# Patient Record
Sex: FEMALE | Race: BLACK OR AFRICAN AMERICAN | NOT HISPANIC OR LATINO | Employment: PART TIME | URBAN - METROPOLITAN AREA
[De-identification: names, ages, dates, MRNs, and addresses within clinical notes are randomized per-mention and may not be internally consistent; named-entity substitution may affect disease eponyms.]

---

## 2024-02-14 ENCOUNTER — HOSPITAL ENCOUNTER (EMERGENCY)
Facility: HOSPITAL | Age: 39
Discharge: OTHER INSTITUTION | End: 2024-02-15
Attending: STUDENT IN AN ORGANIZED HEALTH CARE EDUCATION/TRAINING PROGRAM

## 2024-02-14 DIAGNOSIS — S05.00XA CORNEAL ABRASION, UNSPECIFIED LATERALITY, INITIAL ENCOUNTER: Primary | ICD-10-CM

## 2024-02-14 PROCEDURE — 99285 EMERGENCY DEPT VISIT HI MDM: CPT | Performed by: STUDENT IN AN ORGANIZED HEALTH CARE EDUCATION/TRAINING PROGRAM

## 2024-02-14 PROCEDURE — 99283 EMERGENCY DEPT VISIT LOW MDM: CPT

## 2024-02-14 RX ORDER — TETRACAINE HYDROCHLORIDE 5 MG/ML
1 SOLUTION OPHTHALMIC ONCE
Status: COMPLETED | OUTPATIENT
Start: 2024-02-14 | End: 2024-02-15

## 2024-02-14 RX ORDER — SODIUM BICARBONATE 1 MEQ/ML
50 SYRINGE (ML) INTRAVENOUS ONCE
Status: DISCONTINUED | OUTPATIENT
Start: 2024-02-15 | End: 2024-02-15

## 2024-02-14 RX ORDER — ERYTHROMYCIN 5 MG/G
1 OINTMENT OPHTHALMIC ONCE
Status: COMPLETED | OUTPATIENT
Start: 2024-02-15 | End: 2024-02-15

## 2024-02-14 ASSESSMENT — PAIN SCALES - GENERAL: PAINLEVEL_OUTOF10: 10 - WORST POSSIBLE PAIN

## 2024-02-14 ASSESSMENT — PAIN - FUNCTIONAL ASSESSMENT: PAIN_FUNCTIONAL_ASSESSMENT: 0-10

## 2024-02-14 ASSESSMENT — COLUMBIA-SUICIDE SEVERITY RATING SCALE - C-SSRS
2. HAVE YOU ACTUALLY HAD ANY THOUGHTS OF KILLING YOURSELF?: NO
1. IN THE PAST MONTH, HAVE YOU WISHED YOU WERE DEAD OR WISHED YOU COULD GO TO SLEEP AND NOT WAKE UP?: NO
6. HAVE YOU EVER DONE ANYTHING, STARTED TO DO ANYTHING, OR PREPARED TO DO ANYTHING TO END YOUR LIFE?: NO

## 2024-02-14 ASSESSMENT — LIFESTYLE VARIABLES
HAVE PEOPLE ANNOYED YOU BY CRITICIZING YOUR DRINKING: NO
EVER HAD A DRINK FIRST THING IN THE MORNING TO STEADY YOUR NERVES TO GET RID OF A HANGOVER: NO
EVER FELT BAD OR GUILTY ABOUT YOUR DRINKING: NO
HAVE YOU EVER FELT YOU SHOULD CUT DOWN ON YOUR DRINKING: NO

## 2024-02-14 ASSESSMENT — PAIN DESCRIPTION - LOCATION: LOCATION: EYE

## 2024-02-14 ASSESSMENT — PAIN DESCRIPTION - PAIN TYPE: TYPE: ACUTE PAIN

## 2024-02-14 ASSESSMENT — PAIN DESCRIPTION - FREQUENCY: FREQUENCY: CONSTANT/CONTINUOUS

## 2024-02-14 ASSESSMENT — PAIN DESCRIPTION - ORIENTATION: ORIENTATION: LEFT

## 2024-02-15 ENCOUNTER — HOSPITAL ENCOUNTER (EMERGENCY)
Facility: HOSPITAL | Age: 39
Discharge: HOME | End: 2024-02-15
Attending: EMERGENCY MEDICINE

## 2024-02-15 VITALS
BODY MASS INDEX: 23.98 KG/M2 | TEMPERATURE: 98 F | HEIGHT: 61 IN | RESPIRATION RATE: 16 BRPM | OXYGEN SATURATION: 96 % | HEART RATE: 76 BPM | SYSTOLIC BLOOD PRESSURE: 175 MMHG | WEIGHT: 127 LBS | DIASTOLIC BLOOD PRESSURE: 110 MMHG

## 2024-02-15 VITALS
HEART RATE: 76 BPM | TEMPERATURE: 99.1 F | SYSTOLIC BLOOD PRESSURE: 177 MMHG | RESPIRATION RATE: 17 BRPM | OXYGEN SATURATION: 98 % | HEIGHT: 61 IN | DIASTOLIC BLOOD PRESSURE: 106 MMHG | BODY MASS INDEX: 23.98 KG/M2 | WEIGHT: 127 LBS

## 2024-02-15 DIAGNOSIS — S05.02XA ABRASION OF LEFT CORNEA, INITIAL ENCOUNTER: Primary | ICD-10-CM

## 2024-02-15 PROCEDURE — 99283 EMERGENCY DEPT VISIT LOW MDM: CPT | Performed by: EMERGENCY MEDICINE

## 2024-02-15 PROCEDURE — 99284 EMERGENCY DEPT VISIT MOD MDM: CPT | Mod: 25

## 2024-02-15 PROCEDURE — 2500000001 HC RX 250 WO HCPCS SELF ADMINISTERED DRUGS (ALT 637 FOR MEDICARE OP): Performed by: STUDENT IN AN ORGANIZED HEALTH CARE EDUCATION/TRAINING PROGRAM

## 2024-02-15 PROCEDURE — 2500000001 HC RX 250 WO HCPCS SELF ADMINISTERED DRUGS (ALT 637 FOR MEDICARE OP)

## 2024-02-15 PROCEDURE — 99284 EMERGENCY DEPT VISIT MOD MDM: CPT | Performed by: EMERGENCY MEDICINE

## 2024-02-15 RX ORDER — MOXIFLOXACIN 5 MG/ML
1 SOLUTION/ DROPS OPHTHALMIC ONCE
Status: COMPLETED | OUTPATIENT
Start: 2024-02-15 | End: 2024-02-15

## 2024-02-15 RX ORDER — LORAZEPAM 0.5 MG/1
0.5 TABLET ORAL ONCE
Status: COMPLETED | OUTPATIENT
Start: 2024-02-15 | End: 2024-02-15

## 2024-02-15 RX ORDER — ERYTHROMYCIN 5 MG/G
1 OINTMENT OPHTHALMIC ONCE
Status: COMPLETED | OUTPATIENT
Start: 2024-02-15 | End: 2024-02-15

## 2024-02-15 RX ORDER — IBUPROFEN 600 MG/1
600 TABLET ORAL EVERY 6 HOURS PRN
Qty: 28 TABLET | Refills: 0 | Status: SHIPPED | OUTPATIENT
Start: 2024-02-15 | End: 2024-02-22

## 2024-02-15 RX ORDER — LORAZEPAM 1 MG/1
TABLET ORAL
Status: COMPLETED
Start: 2024-02-15 | End: 2024-02-15

## 2024-02-15 RX ORDER — MULTIVIT-MIN/FERROUS FUMARATE 9 MG/15 ML
1 LIQUID (ML) ORAL 4 TIMES DAILY
Qty: 15 ML | Refills: 0 | Status: SHIPPED | OUTPATIENT
Start: 2024-02-15

## 2024-02-15 RX ORDER — TETRACAINE HYDROCHLORIDE 5 MG/ML
SOLUTION OPHTHALMIC
Status: COMPLETED
Start: 2024-02-15 | End: 2024-02-15

## 2024-02-15 RX ORDER — ERYTHROMYCIN 5 MG/G
1 OINTMENT OPHTHALMIC 2 TIMES DAILY
Qty: 3.5 G | Refills: 0 | Status: SHIPPED | OUTPATIENT
Start: 2024-02-15

## 2024-02-15 RX ORDER — OXYCODONE AND ACETAMINOPHEN 5; 325 MG/1; MG/1
1 TABLET ORAL ONCE
Status: COMPLETED | OUTPATIENT
Start: 2024-02-15 | End: 2024-02-15

## 2024-02-15 RX ORDER — ERYTHROMYCIN 5 MG/G
1 OINTMENT OPHTHALMIC NIGHTLY
Qty: 3.5 G | Refills: 0 | Status: SHIPPED | OUTPATIENT
Start: 2024-02-15 | End: 2024-02-15 | Stop reason: SDUPTHER

## 2024-02-15 RX ORDER — TETRACAINE HYDROCHLORIDE 5 MG/ML
1 SOLUTION OPHTHALMIC ONCE
Status: COMPLETED | OUTPATIENT
Start: 2024-02-15 | End: 2024-02-15

## 2024-02-15 RX ORDER — MOXIFLOXACIN 5 MG/ML
1 SOLUTION/ DROPS OPHTHALMIC 4 TIMES DAILY
Qty: 3 ML | Refills: 0 | Status: SHIPPED | OUTPATIENT
Start: 2024-02-15

## 2024-02-15 RX ADMIN — ERYTHROMYCIN 1 CM: 5 OINTMENT OPHTHALMIC at 00:03

## 2024-02-15 RX ADMIN — LORAZEPAM 0.5 MG: 1 TABLET ORAL at 03:21

## 2024-02-15 RX ADMIN — LORAZEPAM 0.5 MG: 0.5 TABLET ORAL at 03:21

## 2024-02-15 RX ADMIN — FLUORESCEIN SODIUM 1 STRIP: 1 STRIP OPHTHALMIC at 00:07

## 2024-02-15 RX ADMIN — CARBOXYMETHYLCELLULOSE SODIUM 1 DROP: 5 SOLUTION/ DROPS OPHTHALMIC at 07:03

## 2024-02-15 RX ADMIN — TETRACAINE HYDROCHLORIDE 1 DROP: 5 SOLUTION OPHTHALMIC at 10:22

## 2024-02-15 RX ADMIN — ERYTHROMYCIN 1 CM: 5 OINTMENT OPHTHALMIC at 07:03

## 2024-02-15 RX ADMIN — OXYCODONE HYDROCHLORIDE AND ACETAMINOPHEN 1 TABLET: 5; 325 TABLET ORAL at 03:00

## 2024-02-15 RX ADMIN — TETRACAINE HYDROCHLORIDE 1 DROP: 5 SOLUTION OPHTHALMIC at 00:07

## 2024-02-15 RX ADMIN — MOXIFLOXACIN OPHTHALMIC 1 DROP: 5 SOLUTION/ DROPS OPHTHALMIC at 09:42

## 2024-02-15 ASSESSMENT — COLUMBIA-SUICIDE SEVERITY RATING SCALE - C-SSRS
6. HAVE YOU EVER DONE ANYTHING, STARTED TO DO ANYTHING, OR PREPARED TO DO ANYTHING TO END YOUR LIFE?: NO
1. IN THE PAST MONTH, HAVE YOU WISHED YOU WERE DEAD OR WISHED YOU COULD GO TO SLEEP AND NOT WAKE UP?: NO
2. HAVE YOU ACTUALLY HAD ANY THOUGHTS OF KILLING YOURSELF?: NO

## 2024-02-15 ASSESSMENT — PAIN DESCRIPTION - LOCATION
LOCATION: EYE
LOCATION: EYE

## 2024-02-15 ASSESSMENT — PAIN DESCRIPTION - ORIENTATION: ORIENTATION: LEFT

## 2024-02-15 ASSESSMENT — PAIN SCALES - GENERAL
PAINLEVEL_OUTOF10: 6
PAINLEVEL_OUTOF10: 10 - WORST POSSIBLE PAIN

## 2024-02-15 ASSESSMENT — PAIN DESCRIPTION - FREQUENCY: FREQUENCY: CONSTANT/CONTINUOUS

## 2024-02-15 ASSESSMENT — VISUAL ACUITY
OU: 20/50
OS: 20/70
OU: 20/25
OS: 20/200
OD: 20/30
OD: 20/40

## 2024-02-15 ASSESSMENT — PAIN - FUNCTIONAL ASSESSMENT
PAIN_FUNCTIONAL_ASSESSMENT: 0-10
PAIN_FUNCTIONAL_ASSESSMENT: 0-10

## 2024-02-15 ASSESSMENT — PAIN DESCRIPTION - PAIN TYPE: TYPE: ACUTE PAIN

## 2024-02-15 ASSESSMENT — PAIN DESCRIPTION - PROGRESSION: CLINICAL_PROGRESSION: NOT CHANGED

## 2024-02-15 NOTE — PROGRESS NOTES
Emergency Medicine Transition of Care Note.    I received Jacqueline Yee in signout from Dr. Sharp.  Please see the previous ED provider note for all HPI, PE and MDM up to the time of signout at 0700. This is in addition to the primary record.    In brief Jacqueline Yee is an 38 y.o. female presenting for corneal abrasion.    Chief Complaint   Patient presents with    Eye Pain     At the time of signout we were awaiting: Patient discharged.  Pending moxifloxacin drops from pharmacy    Diagnoses as of 02/15/24 1033   Abrasion of left cornea, initial encounter       Medical Decision Making  Patient received moxifloxacin drops from pharmacy.  Patient noted to have passive SI without access to dangerous weapons.  Patient notes her SI was secondary to her pain.  Patient's pain controlled with tetracaine drops.  Patient also prescribed ibuprofen.  Patient to receive 1 drop of tetracaine every 4 hours as needed for 1 day in the right eye.  Discussed with patient that the eye is significantly vascular and quickly heals.  Patient received a work note.  All questions were answered.  Patient remained discharged in stable condition.    Final diagnoses:   [S05.02XA] Abrasion of left cornea, initial encounter           Procedure  Procedures    Patient presentation and plan discussed with attending physician.    Dawson Celis MD

## 2024-02-15 NOTE — ED PROVIDER NOTES
CC: Eye Pain     HPI:  38-year-old female with history of hypertension presents to the emergency department as transfer from outside [facility] for evaluation by ophthalmology for corneal abrasion.  Patient woke up 3 days ago with burning pain to the left eye.  Evaluation at [place] ED demonstrates fluorescein uptake overlying the cornea, consistent with abrasion, along with decreased visual acuity (OS 20/70, OD 20/40).  Pain improved transiently with tetracaine, but on reevaluation with severe pain and discomfort.  Given persistent discomfort and diminished visual acuity, was discussed with ophthalmology, transferred here by private vehicle for evaluation.    Records Reviewed:  Recent available ED and inpatient notes reviewed in EMR.    PMHx/PSHx:  Per HPI.   - has a past medical history of Hypertension.  - has no past surgical history on file.  - does not have a problem list on file.    Medications:  Reviewed in EMR. See EMR for complete list of medications and doses.    Allergies:  Patient has no known allergies.    Social History:  - Tobacco:  has no history on file for tobacco use.   - Alcohol:  has no history on file for alcohol use.   - Illicit Drugs:  has no history on file for drug use.     ROS:  Per HPI.       ???????????????????????????????????????????????????????????????  Triage Vitals:  T 36.1 °C (96.9 °F)  HR 78  BP (!) 195/115  RR 16  O2 98 %      Physical Exam  Vitals and nursing note reviewed.   Constitutional:       General: She is not in acute distress.  HENT:      Head: Atraumatic.   Eyes:      General: No scleral icterus.     Comments: OS conjunctival injection with photophobia   Pulmonary:      Effort: Pulmonary effort is normal. No respiratory distress.   Neurological:      Mental Status: She is alert.      Cranial Nerves: No facial asymmetry.   Psychiatric:      Comments: Anxious mood and affect, pacing       ???????????????????????????????????????????????????????????????  Assessment  and Plan:  38-year-old female presents to the emergency department as transfer from outside ED for evaluation by ophthalmology for a left-sided corneal abrasion with persistent pain and diminished visual acuity.  Differential includes corneal abrasion versus developing corneal ulcer.  I did speak with the ophthalmology resident, will evaluate at bedside.    On repeat evaluation patient very anxious, states has a history of anxiety and is having severe pain.  Was given a Percocet for pain control as she does not want additional tetracaine at this time.    ED Course:  Ophthalmology evaluated the patient at bedside.  She has a corneal abrasion, but no evidence for ulceration.  Was prescribed moxifloxacin 4 times daily left eye, artificial tears 4 times daily left eye, and erythromycin ointment twice daily left eye.  Provided dose of antibiotics here.  Will be arranged for follow-up appointment on Friday.  Discharged home in stable condition.    Social Determinants Limiting Care:  None identified    Disposition:  Discharge home    --  Juliette hSarp MD  Emergency Medicine, PGY-3      Diagnoses as of 02/15/24 0659   Abrasion of left cornea, initial encounter     Procedures ? [employer] last updated 2/15/2024 2:57 AM           Juliette Sharp MD  Resident  02/15/24 0702  ----------------------------------    This patient was seen by the resident physician. I have seen and examined the patient, agree with the workup, evaluation, management and diagnosis. The care plan has been discussed and I concur.    My assessment reveals the following:    HPI:  Patient is a 37 y/o female presenting as transfer from [place] for ophtho eval of left eye pain. Woke up 3 days ago with burning pain to left eye. No trauma or injury. No excessive rubbing of left eye. Does not wear contacts. Visual acuity 20/70 in left eye. Pain improved with tetracaine at referring, but still had severe pain on re-evaluation, so sent to [facility] ED for ophtho eval. No  F/C/N/V. No headache. No CP/SOB/abd pain.  Does report photophobia.     PE:  Vital signs reviewed in nursing triage note, EMR flowsheets, and at patient's bedside  GEN: Patient is awake, alert, calm, cooperative, and in mild ophtho distress.  HEAD: Normocephalic and atraumatic.  EYES: Anicteric sclera. PERRL. EOMI. Left eye injected conjunctiva. No discharge. Photophobia.   MOUTH: Mucous membranes moist.  EXT: No deformities noted.   NEURO: Moves all extremities.     Medical Decision Making:  - Ophtho consult    Differential Diagnoses Considered: Corneal abrasions, Corneal ulcer,     Escalation of Care:  Appropriate for outpatient management    Prescription Drug Consideration: Eye abx, Artificial tears    MD Austin Berman MD  02/15/24 0884

## 2024-02-15 NOTE — Clinical Note
Jacqueline Yee was seen and treated in our emergency department on 2/15/2024.  She may return to work on 02/19/2024.       If you have any questions or concerns, please don't hesitate to call.      Dawson Celis MD

## 2024-02-15 NOTE — CONSULTS
Reason For Consult  Corneal abrasion    History Of Present Illness  Jacqueline Yee is a 38 y.o. female presenting with eye pain and redness since 2/12/24. Patient woke up Monday morning with severe sharp pain in left eye with associated eyelid swelling. Has trialed some ATs at home without much relief. Feels as though everytime she blinks there is a scratching sensation on the front of the eye. Has not been getting enough sleep due to symptoms. Denies fever, chills, flashes, floaters, loss of vision. No past ocular history.    Past Medical History  She has a past medical history of Hypertension.    Physical Exam  Base Eye Exam       Visual Acuity (Snellen - Linear)         Right Left    Dist sc 20/60 20/100    Dist ph sc 20/20 20/40              Tonometry (Tonopen, 5:15 AM)         Right Left    Pressure 15 16              Pupils         Pupils Dark Light Shape React APD    Right PERRL, No APD 3 2 Round Brisk None    Left PERRL, No APD 3 2 Round Brisk None              Visual Fields (Counting fingers)         Left Right     Full Full              Extraocular Movement         Right Left     Full Full              Neuro/Psych       Oriented x3: Yes                  Additional Tests       Color         Right Left    Ishihara 11/11 11/11                  Slit Lamp and Fundus Exam       External Exam         Right Left    External Normal Normal              Slit Lamp Exam         Right Left    Lids/Lashes Normal Normal, no foreign body (FB) on lid eversion/sweeping    Conjunctiva/Sclera White and quiet 2+ injection 360    Cornea 2+ SPK 2mm x 2mm paracentral epithelial defect with rolled edges 6 o'clock. Siedel negative, no surrounding infiltrate/haze    Anterior Chamber Deep and quiet Deep and quiet    Iris Round and reactive Round and reactive    Lens Clear Clear    Anterior Vitreous Normal Normal              Fundus Exam         Right Left    Disc Normal Normal    C/D Ratio 0.20 0.20    Macula Normal Normal    Vessels  "Normal Normal    Periphery Normal Normal                     Last Recorded Vitals  Blood pressure (!) 195/115, pulse 78, temperature 36.1 °C (96.9 °F), temperature source Temporal, resp. rate 16, height 1.537 m (5' 0.5\"), weight 57.6 kg (127 lb), last menstrual period 01/29/2024, SpO2 98 %.       Assessment/Plan   #Corneal abrasion OS   - Lids swept, everted without foreign body  - Entrance testing reassuring  - Entrance testing reassuring, difficult due to patient discomfort, cooperation upon awakening  - DFE without pathology   - 2mm x 2mm paracentral epithelial defect without surrounding haze/infiltrate  - Pain mgmt per ED, informed patient tylenol, NSAIDs, lubrication  - Start moxifloxacin ophthalmic drops qid OS   - Start erythromycin ophthalmic ointment BID OS   - Start artificial tears QID OS   - Ulcer precautions discussed with patient  - Ophthalmology will arrange for close outpatient follow up. Given length of time of defect without antibiotics and worsening symptoms, would prefer sooner appt for follow up to ensure improvement/stability before the weekend      Rodri Freitas MD  PGY2 Ophthalmology     Ophthalmology Adult Pager - [phone]  Ophthalmology Pediatrics Pager - [phone]    For adult follow-up appointments, call: [phone]  For pediatric follow-up appointments, call: [phone]      NOTE: This note is not finalized until attending reviews and signs.              "

## 2024-02-15 NOTE — ED TRIAGE NOTES
Pt presents after being evaluated from OSH for left eye abrasion, Ophthomology Consult. Pt states she woke up and it was like this and has been going on for 3 days. Patient states she is supposed to be on medication for her high blood pressure but she cannot afford it and does not have insurance. It was explained to her by this RN that we can find resources for her.

## 2024-02-15 NOTE — ED PROVIDER NOTES
HPI   Chief Complaint   Patient presents with    Eye Pain     39 y/o female complains of left eye irritation. Patient states she was seen at [facility] for same complaint and nothing could befound wrong at that time. Patient stats she has been suffering for 72 hours        38-year-old female past medical history of hypertension presenting to the emergency department for left eye.  She was seen at [facility] emergency department earlier today was found to have no acute findings and subsequently discharged.  She states that she has been having persistent pain over the last day or 2.  She reports pain when she blinks.  She reports of no eye pain itself.  She denies any headache vision changes or any other muscle aches and pains.  She states her eyes Are watering due to the discomfort.  She denies any caustic exposure to eye.  She does not recall any trauma to the left eye.                          [place] Coma Scale Score: 15                     Patient History   No past medical history on file.  No past surgical history on file.  No family history on file.  Social History     Tobacco Use    Smoking status: Not on file    Smokeless tobacco: Not on file   Substance Use Topics    Alcohol use: Not on file    Drug use: Not on file       Physical Exam   ED Triage Vitals [02/14/24 2309]   Temperature Heart Rate Respirations BP   36.7 °C (98.1 °F) 96 20 (!) 201/105      Pulse Ox Temp Source Heart Rate Source Patient Position   98 % Temporal Monitor Sitting      BP Location FiO2 (%)     Left arm --       Physical Exam  Vitals reviewed.   Constitutional:       Appearance: Normal appearance.   HENT:      Head: Normocephalic and atraumatic.      Nose: Nose normal.      Mouth/Throat:      Mouth: Mucous membranes are moist.   Eyes:      Extraocular Movements: Extraocular movements intact.        Comments: Left eye injection.  Corneal abrasion overlying the left cornea as described above   Cardiovascular:      Rate and Rhythm:  Normal rate and regular rhythm.      Pulses: Normal pulses.      Heart sounds: Normal heart sounds.   Pulmonary:      Effort: Pulmonary effort is normal.      Breath sounds: Normal breath sounds.   Abdominal:      General: Abdomen is flat. Bowel sounds are normal.      Palpations: Abdomen is soft.   Musculoskeletal:         General: Normal range of motion.   Skin:     General: Skin is warm and dry.      Capillary Refill: Capillary refill takes less than 2 seconds.   Neurological:      Mental Status: She is alert and oriented to person, place, and time. Mental status is at baseline.      Cranial Nerves: Cranial nerves 2-12 are intact. No cranial nerve deficit.      Sensory: Sensation is intact. No sensory deficit.      Motor: Motor function is intact. No weakness.   Psychiatric:         Mood and Affect: Mood normal.         ED Course & MDM   ED Course as of 02/15/24 0048   Thu Feb 15, 2024   0032 38-year-old history of hypertension presents emergency department for left eye discomfort.  On examination vital signs are stable 2+ peripheral pulses abdomen nontender equal and reactive pupils extraocular motions intact.  Patient's left eye is injected fluorescein stain shows uptake overlying the cornea with no Sindell sign.  Visual acuity is 20/70 in the left eye and 20/40 in the right eye.  Patient does not use corrective lenses or has history of diabetes.  Tetracaine was used patient had resolution of discomfort however when erythromycin was administered patient is having severe pain and discomfort.  Due to persistent discomfort and visual acuity 20/70 as requested evaluation by ophthalmology. [ZS]   0041 Case was discussed with ophthalmology they have excepted patient they will evaluate patient down at [facility].  Patient has equal reactive pupils would not cooperate with Eleuterio-Pen examination. [ZS]   0042 Epting physician  Case discussed with  their resident [ZS]   0047 Differential considered however  unlikely is globe rupture, orbital cellulitis based on patient's history presentation examination.  Examination clearly shows a corneal abrasion.  She has equal and reactive pupils no photophobia thus no concern for acute angle glaucoma.  Patient will not cooperate with detailed examination of eye. [ZS]   0048 Mother will drive patient to [facility]. [ZS]      ED Course User Index  [ZS] Eran El MD         Diagnoses as of 02/15/24 0048   Corneal abrasion, unspecified laterality, initial encounter       Medical Decision Making      Procedure  Procedures     Eran El MD  02/15/24 0048       Eran El MD  02/15/24 0048

## 2024-02-16 ENCOUNTER — APPOINTMENT (OUTPATIENT)
Dept: OPHTHALMOLOGY | Facility: CLINIC | Age: 39
End: 2024-02-16

## 2024-02-23 ENCOUNTER — OFFICE VISIT (OUTPATIENT)
Dept: OPHTHALMOLOGY | Facility: CLINIC | Age: 39
End: 2024-02-23

## 2024-02-23 DIAGNOSIS — S05.02XD ABRASION OF LEFT CORNEA, SUBSEQUENT ENCOUNTER: Primary | ICD-10-CM

## 2024-02-23 PROCEDURE — 99203 OFFICE O/P NEW LOW 30 MIN: CPT | Performed by: OPHTHALMOLOGY

## 2024-02-23 ASSESSMENT — VISUAL ACUITY
METHOD: SNELLEN - LINEAR
OS_SC: 20/20
OD_SC: 20/20

## 2024-02-23 ASSESSMENT — SLIT LAMP EXAM - LIDS: COMMENTS: NORMAL

## 2024-02-23 ASSESSMENT — EXTERNAL EXAM - LEFT EYE: OS_EXAM: NORMAL

## 2024-02-23 ASSESSMENT — EXTERNAL EXAM - RIGHT EYE: OD_EXAM: NORMAL

## 2024-02-23 NOTE — PROGRESS NOTES
Assessment/Plan   Diagnoses and all orders for this visit:  Abrasion of left cornea, subsequent encounter  Resolved  Reassurance  ATs prn

## 2024-03-23 ENCOUNTER — APPOINTMENT (OUTPATIENT)
Dept: OPHTHALMOLOGY | Facility: CLINIC | Age: 39
End: 2024-03-23

## 2024-04-30 ENCOUNTER — APPOINTMENT (OUTPATIENT)
Dept: OPHTHALMOLOGY | Facility: CLINIC | Age: 39
End: 2024-04-30